# Patient Record
Sex: FEMALE | Race: WHITE | HISPANIC OR LATINO | URBAN - METROPOLITAN AREA
[De-identification: names, ages, dates, MRNs, and addresses within clinical notes are randomized per-mention and may not be internally consistent; named-entity substitution may affect disease eponyms.]

---

## 2021-11-10 ENCOUNTER — OFFICE VISIT (OUTPATIENT)
Dept: URBAN - METROPOLITAN AREA CLINIC 63 | Facility: CLINIC | Age: 56
End: 2021-11-10

## 2021-12-16 ENCOUNTER — OFFICE VISIT (OUTPATIENT)
Dept: URBAN - METROPOLITAN AREA CLINIC 63 | Facility: CLINIC | Age: 56
End: 2021-12-16

## 2021-12-24 LAB
% SATURATION: (no result)
ABSOLUTE BASOPHILS: (no result)
ABSOLUTE EOSINOPHILS: (no result)
ABSOLUTE LYMPHOCYTES: (no result)
ABSOLUTE MONOCYTES: (no result)
ABSOLUTE NEUTROPHILS: (no result)
ALBUMIN/GLOBULIN RATIO: (no result)
ALBUMIN: (no result)
ALKALINE PHOSPHATASE: (no result)
ALT: (no result)
AST: (no result)
BASOPHILS: (no result)
BILIRUBIN, TOTAL: (no result)
BUN/CREATININE RATIO: (no result)
CALCIUM: (no result)
CARBON DIOXIDE: (no result)
CHLORIDE: (no result)
CREATININE: (no result)
EGFR AFRICAN AMERIC: (no result)
EGFR NON-AFR. AMERI: (no result)
EOSINOPHILS: (no result)
FERRITIN: (no result)
FOLATE, SERUM: (no result)
GLOBULIN: (no result)
GLUCOSE: (no result)
HEMATOCRIT: (no result)
HEMOGLOBIN: (no result)
IRON BINDING CAPACITY: (no result)
IRON, TOTAL: (no result)
LYMPHOCYTES: (no result)
MCH: (no result)
MCHC: (no result)
MCV: (no result)
MONOCYTES: (no result)
MPV: (no result)
NEUTROPHILS: (no result)
PLATELET COUNT: (no result)
POTASSIUM: (no result)
PROTEIN, TOTAL: (no result)
RDW: (no result)
RED BLOOD CELL COUNT: (no result)
SODIUM: (no result)
UREA NITROGEN (BUN): (no result)
VITAMIN B12: (no result)
WHITE BLOOD CELL COUNT: (no result)

## 2022-01-05 ENCOUNTER — OFFICE VISIT (OUTPATIENT)
Dept: URBAN - METROPOLITAN AREA CLINIC 63 | Facility: CLINIC | Age: 57
End: 2022-01-05

## 2022-07-09 ENCOUNTER — TELEPHONE ENCOUNTER (OUTPATIENT)
Dept: URBAN - METROPOLITAN AREA CLINIC 121 | Facility: CLINIC | Age: 57
End: 2022-07-09

## 2022-07-09 RX ORDER — PANTOPRAZOLE SODIUM 40 MG/1
TABLET, DELAYED RELEASE ORAL ONCE A DAY
Refills: 0 | OUTPATIENT
Start: 2020-01-26 | End: 2021-11-10

## 2022-07-09 RX ORDER — ESCITALOPRAM 10 MG/1
TABLET, FILM COATED ORAL
Refills: 0 | OUTPATIENT
Start: 2020-02-13 | End: 2021-11-10

## 2022-07-09 RX ORDER — INSULIN GLARGINE 100 [IU]/ML
INJECTION, SOLUTION SUBCUTANEOUS
Refills: 0 | OUTPATIENT
Start: 2020-02-05 | End: 2021-11-10

## 2022-07-09 RX ORDER — INSULIN LISPRO 100 [IU]/ML
INJECTION, SOLUTION INTRAVENOUS; SUBCUTANEOUS
Refills: 0 | OUTPATIENT
Start: 2020-02-04 | End: 2021-11-10

## 2022-07-09 RX ORDER — FUROSEMIDE 40 MG/1
TABLET ORAL
Refills: 0 | OUTPATIENT
Start: 2020-02-03 | End: 2021-11-10

## 2022-07-09 RX ORDER — ALPRAZOLAM 1 MG/1
TABLET ORAL
Refills: 0 | OUTPATIENT
Start: 2020-02-13 | End: 2021-11-10

## 2022-07-09 RX ORDER — AMLODIPINE BESYLATE 2.5 MG/1
TABLET ORAL ONCE A DAY
Refills: 0 | OUTPATIENT
Start: 2020-01-26 | End: 2021-11-10

## 2022-07-09 RX ORDER — TELMISARTAN 40 MG/1
TABLET ORAL
Refills: 0 | OUTPATIENT
Start: 2020-02-11 | End: 2021-11-10

## 2022-07-09 RX ORDER — BRINZOLAMIDE/BRIMONIDINE TARTRATE 10; 2 MG/ML; MG/ML
SUSPENSION/ DROPS OPHTHALMIC
Refills: 0 | OUTPATIENT
Start: 2020-02-12 | End: 2021-11-10

## 2022-07-09 RX ORDER — SERTRALINE 25 MG/1
TABLET, FILM COATED ORAL ONCE A DAY
Refills: 0 | OUTPATIENT
Start: 2022-01-05 | End: 2022-01-05

## 2022-07-09 RX ORDER — HYDRALAZINE HYDROCHLORIDE 25 MG/1
TABLET ORAL ONCE A DAY
Refills: 0 | OUTPATIENT
Start: 2021-11-10 | End: 2022-01-05

## 2022-07-09 RX ORDER — CARVEDILOL 6.25 MG/1
TABLET, FILM COATED ORAL ONCE A DAY
Refills: 0 | OUTPATIENT
Start: 2020-02-11 | End: 2021-11-10

## 2022-07-09 RX ORDER — BRINZOLAMIDE/BRIMONIDINE TARTRATE 10; 2 MG/ML; MG/ML
SUSPENSION/ DROPS OPHTHALMIC TWICE A DAY
Refills: 0 | OUTPATIENT
Start: 2021-11-10 | End: 2022-01-05

## 2022-07-09 RX ORDER — ROSUVASTATIN CALCIUM 40 MG/1
TABLET, FILM COATED ORAL ONCE A DAY
Refills: 0 | OUTPATIENT
Start: 2021-11-10 | End: 2022-01-05

## 2022-07-09 RX ORDER — SERTRALINE 25 MG/1
TABLET, FILM COATED ORAL ONCE A DAY
Refills: 0 | OUTPATIENT
Start: 2021-11-10 | End: 2022-01-05

## 2022-07-10 ENCOUNTER — TELEPHONE ENCOUNTER (OUTPATIENT)
Dept: URBAN - METROPOLITAN AREA CLINIC 121 | Facility: CLINIC | Age: 57
End: 2022-07-10

## 2022-07-10 RX ORDER — ALPRAZOLAM 1 MG/1
TABLET ORAL AS NEEDED
Refills: 0 | Status: ACTIVE | COMMUNITY
Start: 2021-11-10

## 2022-07-10 RX ORDER — BRINZOLAMIDE/BRIMONIDINE TARTRATE 10; 2 MG/ML; MG/ML
SUSPENSION/ DROPS OPHTHALMIC TWICE A DAY
Refills: 0 | Status: ACTIVE | COMMUNITY
Start: 2022-01-05

## 2022-07-10 RX ORDER — INSULIN LISPRO 100 [IU]/ML
30 UNITS 3 TIMES A DAY INJECTION, SOLUTION INTRAVENOUS; SUBCUTANEOUS
Refills: 0 | Status: ACTIVE | COMMUNITY
Start: 2021-11-10

## 2022-07-10 RX ORDER — CARVEDILOL 6.25 MG/1
TABLET, FILM COATED ORAL TWICE A DAY
Refills: 0 | Status: ACTIVE | COMMUNITY
Start: 2021-11-10

## 2022-07-10 RX ORDER — ICOSAPENT ETHYL 1000 MG/1
CAPSULE ORAL ONCE A DAY
Refills: 0 | Status: ACTIVE | COMMUNITY
Start: 2022-01-05

## 2022-07-10 RX ORDER — HYDRALAZINE HYDROCHLORIDE 25 MG/1
TABLET ORAL ONCE A DAY
Refills: 0 | Status: ACTIVE | COMMUNITY
Start: 2022-01-05

## 2022-07-10 RX ORDER — CARVEDILOL 6.25 MG/1
TABLET, FILM COATED ORAL ONCE A DAY
Refills: 0 | Status: ACTIVE | COMMUNITY
Start: 2022-01-05

## 2022-07-10 RX ORDER — FUROSEMIDE 40 MG/1
TABLET ORAL ONCE A DAY
Refills: 0 | Status: ACTIVE | COMMUNITY
Start: 2021-11-10

## 2022-07-10 RX ORDER — ROSUVASTATIN CALCIUM 40 MG/1
TABLET, FILM COATED ORAL ONCE A DAY
Refills: 0 | Status: ACTIVE | COMMUNITY
Start: 2022-01-05

## 2022-07-10 RX ORDER — INSULIN GLARGINE 100 [IU]/ML
12 UNITS TWICE A DAY INJECTION, SOLUTION SUBCUTANEOUS
Refills: 0 | Status: ACTIVE | COMMUNITY
Start: 2021-11-10

## 2022-07-10 RX ORDER — PREDNISOLONE ACETATE 10 MG/ML
SUSPENSION/ DROPS OPHTHALMIC ONCE A DAY
Refills: 0 | Status: ACTIVE | COMMUNITY
Start: 2022-01-05

## 2023-05-11 ENCOUNTER — P2P PATIENT RECORD (OUTPATIENT)
Age: 58
End: 2023-05-11

## 2023-05-17 ENCOUNTER — TELEPHONE ENCOUNTER (OUTPATIENT)
Dept: URBAN - METROPOLITAN AREA CLINIC 64 | Facility: CLINIC | Age: 58
End: 2023-05-17

## 2023-06-05 ENCOUNTER — OFFICE VISIT (OUTPATIENT)
Dept: URBAN - METROPOLITAN AREA CLINIC 63 | Facility: CLINIC | Age: 58
End: 2023-06-05

## 2023-06-05 RX ORDER — CARVEDILOL 6.25 MG/1
TABLET, FILM COATED ORAL TWICE A DAY
Refills: 0 | COMMUNITY
Start: 2021-11-10

## 2023-06-05 RX ORDER — ROSUVASTATIN CALCIUM 40 MG/1
TABLET, FILM COATED ORAL ONCE A DAY
Refills: 0 | COMMUNITY
Start: 2022-01-05

## 2023-06-05 RX ORDER — INSULIN GLARGINE 100 [IU]/ML
12 UNITS TWICE A DAY INJECTION, SOLUTION SUBCUTANEOUS
Refills: 0 | COMMUNITY
Start: 2021-11-10

## 2023-06-05 RX ORDER — FUROSEMIDE 40 MG/1
TABLET ORAL ONCE A DAY
Refills: 0 | COMMUNITY
Start: 2021-11-10

## 2023-06-05 RX ORDER — ICOSAPENT ETHYL 1000 MG/1
CAPSULE ORAL ONCE A DAY
Refills: 0 | COMMUNITY
Start: 2022-01-05

## 2023-06-05 RX ORDER — PREDNISOLONE ACETATE 10 MG/ML
SUSPENSION/ DROPS OPHTHALMIC ONCE A DAY
Refills: 0 | COMMUNITY
Start: 2022-01-05

## 2023-06-05 RX ORDER — ALPRAZOLAM 1 MG/1
TABLET ORAL AS NEEDED
Refills: 0 | COMMUNITY
Start: 2021-11-10

## 2023-06-05 RX ORDER — HYDRALAZINE HYDROCHLORIDE 25 MG/1
TABLET ORAL ONCE A DAY
Refills: 0 | COMMUNITY
Start: 2022-01-05

## 2023-06-05 RX ORDER — BRINZOLAMIDE/BRIMONIDINE TARTRATE 10; 2 MG/ML; MG/ML
SUSPENSION/ DROPS OPHTHALMIC TWICE A DAY
Refills: 0 | COMMUNITY
Start: 2022-01-05

## 2023-06-05 RX ORDER — INSULIN LISPRO 100 [IU]/ML
30 UNITS 3 TIMES A DAY INJECTION, SOLUTION INTRAVENOUS; SUBCUTANEOUS
Refills: 0 | COMMUNITY
Start: 2021-11-10

## 2023-06-05 NOTE — HPI-TODAY'S VISIT:
Established patient: Abdominal pain, gas  PMH: CHF, IDDM 1B, HTN, HLD, CKD 4, fibromyalgia, polymyalgia, osteoarthritis, anxiety/depression, blindness, seizures, gastroparesis PSH: Cholecystectomy, appendectomy, hysterectomy,  x2, neck, elbow, hand

## 2023-06-05 NOTE — HPI-PREVIOUS LABS
Lab work dated 28 April 2023 demonstrates the following abnormalities: HDL 28, triglycerides 232, , cholesterol/HDL ratio 7.0, non-, glucose 192, BUN 30, creatinine 1.94, EGFR 29, WBC 13.5, hemoglobin 10.6, hematocrit 33.4%, MCH 26.5, MCHC 31.7, absolute neutrophils 9356. serum iron 42/13%, , hyaline cast 0-5, vitamin D 20.  All remaining lab values of CBC, CMP, protein electrophoresis, lipid panel and urinalysis are negative or within normal limits. ********** Lab work dated 23 December 2021 demonstrates the following abnormalities: WBC 14.3, MCHC 31.6, RDW 15.2%, absolute neutrophils 77054, glucose 315, BUN 36, creatinine 1.87, GFR non-African-American 29, iron saturation 15%, serum folate 4.3.  All remaining lab values of CBC, CMP and anemia panel are within normal limits.  See full results below. ********** Lab work dated 22 January 2020 demonstrates the following abnormalities: WBC 14.6, RBC 3.34, hemoglobin 9.4, hematocrit 29.1%, RDW 14.6%, absolute neutrophils 9.9, anion gap 6, glucose 188, BUN 30, creatinine 1.74, magnesium 2.6, GFR non-African-American 30.4.  All remaining lab values of CBC, CMP are within normal limits.

## 2023-06-05 NOTE — HPI-PREVIOUS IMAGING
CT virtual colonography/02 December 2021.  No large polyp, stricture or mass.  2.2 cm ascending colon lipoma.  Recommend repeat CT colonography in 5 years. ********** CT abdomen and pelvis without contrast/17 January 2020.  No evidence of acute pathology. ********** RUQ ultrasound/17 January 2020.  Mildly prominent liver. ********** Echocardiogram/16 January 2020 (Melody Bowman MD).  The quantitative EF by 3D imaging is 60%.  Normal left ventricular systolic function.  There is moderate concentric left ventricular hypertrophy.  There is grade 2 left ventricular diastolic dysfunction.  Left ventricular global longitudinal strain is reduced (-11.8%).  Right ventricular systolic function is normal.  Left atrium cavity is mildly dilated.  There is trace tricuspid valve regurgitation.  There is no pulmonary hypertension.  The estimated right ventricular systolic pressure is 24.0 mmHg.  There is a trivial to small circumferential pericardial effusion anterior and posterior to the heart.  No significant change compared to previous echocardiogram on 6/17/2019.  A complete echo was performed using color-flow Doppler, spectral Doppler, M-mode, strain and 3D.

## 2023-06-20 ENCOUNTER — WEB ENCOUNTER (OUTPATIENT)
Dept: URBAN - METROPOLITAN AREA CLINIC 63 | Facility: CLINIC | Age: 58
End: 2023-06-20

## 2023-06-20 ENCOUNTER — LAB OUTSIDE AN ENCOUNTER (OUTPATIENT)
Dept: URBAN - METROPOLITAN AREA CLINIC 63 | Facility: CLINIC | Age: 58
End: 2023-06-20

## 2023-06-20 ENCOUNTER — OFFICE VISIT (OUTPATIENT)
Dept: URBAN - METROPOLITAN AREA CLINIC 63 | Facility: CLINIC | Age: 58
End: 2023-06-20
Payer: COMMERCIAL

## 2023-06-20 VITALS
DIASTOLIC BLOOD PRESSURE: 58 MMHG | TEMPERATURE: 96.9 F | OXYGEN SATURATION: 98 % | WEIGHT: 213 LBS | HEIGHT: 61 IN | SYSTOLIC BLOOD PRESSURE: 102 MMHG | HEART RATE: 89 BPM | BODY MASS INDEX: 40.22 KG/M2

## 2023-06-20 DIAGNOSIS — R19.6 BREATH, FOUL: ICD-10-CM

## 2023-06-20 DIAGNOSIS — R10.13 EPIGASTRIC PAIN: ICD-10-CM

## 2023-06-20 PROBLEM — 79879001: Status: ACTIVE | Noted: 2023-06-20

## 2023-06-20 PROCEDURE — 99214 OFFICE O/P EST MOD 30 MIN: CPT | Performed by: NURSE PRACTITIONER

## 2023-06-20 RX ORDER — PREDNISOLONE ACETATE 10 MG/ML
SUSPENSION/ DROPS OPHTHALMIC ONCE A DAY
Refills: 0 | Status: DISCONTINUED | COMMUNITY
Start: 2022-01-05

## 2023-06-20 RX ORDER — INSULIN GLARGINE 100 [IU]/ML
12 UNITS TWICE A DAY INJECTION, SOLUTION SUBCUTANEOUS
Refills: 0 | Status: DISCONTINUED | COMMUNITY
Start: 2021-11-10

## 2023-06-20 RX ORDER — DULAGLUTIDE 4.5 MG/.5ML
AS DIRECTED INJECTION, SOLUTION SUBCUTANEOUS
Status: ACTIVE | COMMUNITY

## 2023-06-20 RX ORDER — INSULIN LISPRO 100 [IU]/ML
30 UNITS 3 TIMES A DAY INJECTION, SOLUTION INTRAVENOUS; SUBCUTANEOUS
Refills: 0 | Status: ACTIVE | COMMUNITY
Start: 2021-11-10

## 2023-06-20 RX ORDER — FAMOTIDINE 40 MG/1
1 TABLET AT BEDTIME TABLET, FILM COATED ORAL ONCE A DAY
Qty: 90 TABLET | Refills: 1 | OUTPATIENT
Start: 2023-06-20

## 2023-06-20 RX ORDER — ICOSAPENT ETHYL 1000 MG/1
CAPSULE ORAL ONCE A DAY
Refills: 0 | Status: DISCONTINUED | COMMUNITY
Start: 2022-01-05

## 2023-06-20 RX ORDER — ALPRAZOLAM 1 MG/1
TABLET ORAL AS NEEDED
Refills: 0 | Status: ACTIVE | COMMUNITY
Start: 2021-11-10

## 2023-06-20 RX ORDER — ROSUVASTATIN CALCIUM 40 MG/1
TABLET, FILM COATED ORAL ONCE A DAY
Refills: 0 | Status: ACTIVE | COMMUNITY
Start: 2022-01-05

## 2023-06-20 RX ORDER — FUROSEMIDE 40 MG/1
TABLET ORAL ONCE A DAY
Refills: 0 | Status: ACTIVE | COMMUNITY
Start: 2021-11-10

## 2023-06-20 RX ORDER — BRINZOLAMIDE/BRIMONIDINE TARTRATE 10; 2 MG/ML; MG/ML
SUSPENSION/ DROPS OPHTHALMIC TWICE A DAY
Refills: 0 | Status: DISCONTINUED | COMMUNITY
Start: 2022-01-05

## 2023-06-20 RX ORDER — PANTOPRAZOLE SODIUM 40 MG/1
1 TABLET TABLET, DELAYED RELEASE ORAL ONCE A DAY
Qty: 90 | Refills: 1 | OUTPATIENT
Start: 2023-06-20

## 2023-06-20 RX ORDER — HYDRALAZINE HYDROCHLORIDE 25 MG/1
TABLET ORAL ONCE A DAY
Refills: 0 | Status: DISCONTINUED | COMMUNITY
Start: 2022-01-05

## 2023-06-20 RX ORDER — CARVEDILOL 12.5 MG/1
1 TABLET WITH FOOD TABLET, FILM COATED ORAL TWICE A DAY
Refills: 0 | Status: ACTIVE | COMMUNITY
Start: 2021-11-10

## 2023-06-20 RX ORDER — SERTRALINE HYDROCHLORIDE 50 MG/1
1 TABLET TABLET, FILM COATED ORAL ONCE A DAY
Status: ACTIVE | COMMUNITY

## 2023-06-20 NOTE — HPI-PREVIOUS LABS
Lab work dated 28 April 2023 demonstrates the following abnormalities: HDL 28, triglycerides 232, , cholesterol/HDL ratio 7.0, non-, glucose 192, BUN 30, creatinine 1.94, EGFR 29, WBC 13.5, hemoglobin 10.6, hematocrit 33.4%, MCH 26.5, MCHC 31.7, absolute neutrophils 9356. serum iron 42/13%, , hyaline cast 0-5, vitamin D 20.  All remaining lab values of CBC, CMP, protein electrophoresis, lipid panel and urinalysis are negative or within normal limits. ********** Lab work dated 23 December 2021 demonstrates the following abnormalities: WBC 14.3, MCHC 31.6, RDW 15.2%, absolute neutrophils 04121, glucose 315, BUN 36, creatinine 1.87, GFR non-African-American 29, iron saturation 15%, serum folate 4.3.  All remaining lab values of CBC, CMP and anemia panel are within normal limits.  See full results below. ********** Lab work dated 22 January 2020 demonstrates the following abnormalities: WBC 14.6, RBC 3.34, hemoglobin 9.4, hematocrit 29.1%, RDW 14.6%, absolute neutrophils 9.9, anion gap 6, glucose 188, BUN 30, creatinine 1.74, magnesium 2.6, GFR non-African-American 30.4.  All remaining lab values of CBC, CMP are within normal limits. .

## 2023-06-20 NOTE — HPI-TODAY'S VISIT:
Thank you very much for kindly referring Ladonna Kay, a very pleasant 57-year-old female, back to our service for abdominal pain and gas.  Past medical history significant for CHF, hypertension, hyperlipidemia, IDDM 1, CKD 4, fibromyalgia, polymyalgia, osteoarthritis, anxiety, depression, blindness, seizures, gastroparesis and ascending colon lipoma.  Past surgical history is significant for cholecystectomy, appendectomy,  x2, hysterectomy, cardiac catheterization and numerous orthopedic procedures.  Her last colorectal cancer screening was by CT virtual colonography on 2021 and was unremarkable except for a 2.2 cm ascending colon lipoma with no large polyps, strictures or mass.  She was given a 5-year repeat CT colonography recall.  Ladonna presents today complaining of "excruciating" postprandial pain that has been ongoing for the past 7-8 months.  She relates use of Tylenol, 500 mg, 2-3 capsules/day for her discomfort but also states that it, "does not do anything".  She will have slight dysphagia to meat, but states it is no worse than it is always been over the past 5 years.  Finally, she complains of a "foul smell coming out of me with a deep breath and when I burp I smell rotten eggs."  She is otherwise without gastrointestinal complaint, but then states she has about 1 bowel movement per day or every other day with, "a lot of diarrhea" ([place] 5-6).  She denies pyrosis, change in bowel habits, rectal bleeding, melena or unintentional weight loss.  Of note, she was previously on pantoprazole but has not taken it for over 3 years as best she can recall.

## 2023-07-20 NOTE — PHYSICAL EXAM GASTROINTESTINAL
: Abdomen; soft, nontender, nondistended , no guarding or rigidity, no masses palpable , normal bowel sounds.  Liver and Spleen; no hepatomegaly present,  liver nontender , spleen not palpable 90

## 2023-08-25 ENCOUNTER — OFFICE VISIT (OUTPATIENT)
Dept: URBAN - METROPOLITAN AREA CLINIC 63 | Facility: CLINIC | Age: 58
End: 2023-08-25

## 2023-09-05 ENCOUNTER — TELEPHONE ENCOUNTER (OUTPATIENT)
Dept: URBAN - METROPOLITAN AREA CLINIC 63 | Facility: CLINIC | Age: 58
End: 2023-09-05

## 2023-09-07 ENCOUNTER — OFFICE VISIT (OUTPATIENT)
Dept: URBAN - METROPOLITAN AREA TELEHEALTH 1 | Facility: TELEHEALTH | Age: 58
End: 2023-09-07
Payer: COMMERCIAL

## 2023-09-07 VITALS
BODY MASS INDEX: 39.08 KG/M2 | DIASTOLIC BLOOD PRESSURE: 83 MMHG | HEART RATE: 66 BPM | TEMPERATURE: 98 F | WEIGHT: 207 LBS | SYSTOLIC BLOOD PRESSURE: 127 MMHG | HEIGHT: 61 IN

## 2023-09-07 DIAGNOSIS — R10.84 GENERALIZED ABDOMINAL PAIN: ICD-10-CM

## 2023-09-07 PROCEDURE — 99213 OFFICE O/P EST LOW 20 MIN: CPT | Performed by: NURSE PRACTITIONER

## 2023-09-07 RX ORDER — CARVEDILOL 12.5 MG/1
1 TABLET WITH FOOD TABLET, FILM COATED ORAL TWICE A DAY
Refills: 0 | Status: ACTIVE | COMMUNITY
Start: 2021-11-10

## 2023-09-07 RX ORDER — FUROSEMIDE 40 MG/1
TABLET ORAL ONCE A DAY
Refills: 0 | Status: ACTIVE | COMMUNITY
Start: 2021-11-10

## 2023-09-07 RX ORDER — SERTRALINE HYDROCHLORIDE 50 MG/1
1 TABLET TABLET, FILM COATED ORAL ONCE A DAY
Status: ACTIVE | COMMUNITY

## 2023-09-07 RX ORDER — INSULIN LISPRO 100 [IU]/ML
30 UNITS 3 TIMES A DAY INJECTION, SOLUTION INTRAVENOUS; SUBCUTANEOUS
Refills: 0 | Status: ACTIVE | COMMUNITY
Start: 2021-11-10

## 2023-09-07 RX ORDER — ALPRAZOLAM 1 MG/1
TABLET ORAL AS NEEDED
Refills: 0 | Status: ACTIVE | COMMUNITY
Start: 2021-11-10

## 2023-09-07 RX ORDER — DICYCLOMINE HYDROCHLORIDE 20 MG/1
1 TABLET TABLET ORAL
Qty: 270 | Refills: 1 | OUTPATIENT
Start: 2023-09-07 | End: 2024-03-04

## 2023-09-07 RX ORDER — PANTOPRAZOLE SODIUM 40 MG/1
1 TABLET TABLET, DELAYED RELEASE ORAL ONCE A DAY
Qty: 90 | Refills: 1 | Status: ACTIVE | COMMUNITY
Start: 2023-06-20

## 2023-09-07 RX ORDER — DULAGLUTIDE 4.5 MG/.5ML
AS DIRECTED INJECTION, SOLUTION SUBCUTANEOUS
Status: ACTIVE | COMMUNITY

## 2023-09-07 RX ORDER — FAMOTIDINE 40 MG/1
1 TABLET AT BEDTIME TABLET, FILM COATED ORAL ONCE A DAY
Qty: 90 TABLET | Refills: 1 | Status: ACTIVE | COMMUNITY
Start: 2023-06-20

## 2023-09-07 RX ORDER — ROSUVASTATIN CALCIUM 40 MG/1
TABLET, FILM COATED ORAL ONCE A DAY
Refills: 0 | Status: ACTIVE | COMMUNITY
Start: 2022-01-05

## 2023-09-07 NOTE — HPI-PREVIOUS LABS
Lab work dated 28 April 2023 demonstrates the following abnormalities: HDL 28, triglycerides 232, , cholesterol/HDL ratio 7.0, non-, glucose 192, BUN 30, creatinine 1.94, EGFR 29, WBC 13.5, hemoglobin 10.6, hematocrit 33.4%, MCH 26.5, MCHC 31.7, absolute neutrophils 9356. serum iron 42/13%, , hyaline cast 0-5, vitamin D 20.  All remaining lab values of CBC, CMP, protein electrophoresis, lipid panel and urinalysis are negative or within normal limits. ********** Lab work dated 23 December 2021 demonstrates the following abnormalities: WBC 14.3, MCHC 31.6, RDW 15.2%, absolute neutrophils 49369, glucose 315, BUN 36, creatinine 1.87, GFR non-African-American 29, iron saturation 15%, serum folate 4.3.  All remaining lab values of CBC, CMP and anemia panel are within normal limits.  See full results below. ********** Lab work dated 22 January 2020 demonstrates the following abnormalities: WBC 14.6, RBC 3.34, hemoglobin 9.4, hematocrit 29.1%, RDW 14.6%, absolute neutrophils 9.9, anion gap 6, glucose 188, BUN 30, creatinine 1.74, magnesium 2.6, GFR non-African-American 30.4.  All remaining lab values of CBC, CMP are within normal limits. .

## 2023-09-07 NOTE — HPI-TODAY'S VISIT:
Ladonna Kay is a very pleasant 58-year-old female seen in follow-up of abdominal pain and gas.  At last office visit, she complained of "excruciating" postprandial pain that had persisted for the past 7 months.  She was given pantoprazole in the morning and famotidine at night, which she states, "has not helped at all.  When asked to describe her symptoms again, she complains of abdominal pain, cramping and discomfort when she has a bowel movement or passes gas and relates the discomfort is alleviated with these actions.  She is otherwise asymptomatic from a GI perspective and denies pyrosis, dysphagia, dyspepsia, blood per rectum, melena or unintentional weight loss.

## 2023-09-07 NOTE — HPI-PREVIOUS IMAGING
Upper GI series/28 August 2023.  Normal double contrast barium UGI. ********** CT virtual colonography/02 December 2021.  No large polyp, stricture or mass.  2.2 cm ascending colon lipoma.  Recommend repeat CT colonography in 5 years. ********** CT abdomen and pelvis without contrast/17 January 2020.  No evidence of acute pathology. ********** RUQ ultrasound/17 January 2020.  Mildly prominent liver. ********** Echocardiogram/16 January 2020 (Melody Bowman MD).  The quantitative EF by 3D imaging is 60%.  Normal left ventricular systolic function.  There is moderate concentric left ventricular hypertrophy.  There is grade 2 left ventricular diastolic dysfunction.  Left ventricular global longitudinal strain is reduced (-11.8%).  Right ventricular systolic function is normal.  Left atrium cavity is mildly dilated.  There is trace tricuspid valve regurgitation.  There is no pulmonary hypertension.  The estimated right ventricular systolic pressure is 24.0 mmHg.  There is a trivial to small circumferential pericardial effusion anterior and posterior to the heart.  No significant change compared to previous echocardiogram on 6/17/2019.  A complete echo was performed using color-flow Doppler, spectral Doppler, M-mode, strain and 3D.

## 2023-09-12 ENCOUNTER — TELEPHONE ENCOUNTER (OUTPATIENT)
Dept: URBAN - METROPOLITAN AREA CLINIC 64 | Facility: CLINIC | Age: 58
End: 2023-09-12

## 2023-10-06 ENCOUNTER — DASHBOARD ENCOUNTERS (OUTPATIENT)
Age: 58
End: 2023-10-06

## 2023-10-09 ENCOUNTER — OFFICE VISIT (OUTPATIENT)
Dept: URBAN - METROPOLITAN AREA CLINIC 63 | Facility: CLINIC | Age: 58
End: 2023-10-09

## 2023-10-09 RX ORDER — DULAGLUTIDE 4.5 MG/.5ML
AS DIRECTED INJECTION, SOLUTION SUBCUTANEOUS
Status: ACTIVE | COMMUNITY

## 2023-10-09 RX ORDER — ROSUVASTATIN CALCIUM 40 MG/1
TABLET, FILM COATED ORAL ONCE A DAY
Refills: 0 | Status: ACTIVE | COMMUNITY
Start: 2022-01-05

## 2023-10-09 RX ORDER — INSULIN LISPRO 100 [IU]/ML
30 UNITS 3 TIMES A DAY INJECTION, SOLUTION INTRAVENOUS; SUBCUTANEOUS
Refills: 0 | Status: ACTIVE | COMMUNITY
Start: 2021-11-10

## 2023-10-09 RX ORDER — PANTOPRAZOLE SODIUM 40 MG/1
1 TABLET TABLET, DELAYED RELEASE ORAL ONCE A DAY
Qty: 90 | Refills: 1 | Status: ACTIVE | COMMUNITY
Start: 2023-06-20

## 2023-10-09 RX ORDER — ALPRAZOLAM 1 MG/1
TABLET ORAL AS NEEDED
Refills: 0 | Status: ACTIVE | COMMUNITY
Start: 2021-11-10

## 2023-10-09 RX ORDER — FAMOTIDINE 40 MG/1
1 TABLET AT BEDTIME TABLET, FILM COATED ORAL ONCE A DAY
Qty: 90 TABLET | Refills: 1 | Status: ACTIVE | COMMUNITY
Start: 2023-06-20

## 2023-10-09 RX ORDER — FUROSEMIDE 40 MG/1
TABLET ORAL ONCE A DAY
Refills: 0 | Status: ACTIVE | COMMUNITY
Start: 2021-11-10

## 2023-10-09 RX ORDER — CARVEDILOL 12.5 MG/1
1 TABLET WITH FOOD TABLET, FILM COATED ORAL TWICE A DAY
Refills: 0 | Status: ACTIVE | COMMUNITY
Start: 2021-11-10

## 2023-10-09 RX ORDER — DICYCLOMINE HYDROCHLORIDE 20 MG/1
1 TABLET TABLET ORAL
Qty: 270 | Refills: 1 | Status: ACTIVE | COMMUNITY
Start: 2023-09-07 | End: 2024-03-04

## 2023-10-09 RX ORDER — SERTRALINE HYDROCHLORIDE 50 MG/1
1 TABLET TABLET, FILM COATED ORAL ONCE A DAY
Status: ACTIVE | COMMUNITY

## 2023-10-09 NOTE — HPI-PREVIOUS LABS
Lab work dated 28 April 2023 demonstrates the following abnormalities: HDL 28, triglycerides 232, , cholesterol/HDL ratio 7.0, non-, glucose 192, BUN 30, creatinine 1.94, EGFR 29, WBC 13.5, hemoglobin 10.6, hematocrit 33.4%, MCH 26.5, MCHC 31.7, absolute neutrophils 9356. serum iron 42/13%, , hyaline cast 0-5, vitamin D 20.  All remaining lab values of CBC, CMP, protein electrophoresis, lipid panel and urinalysis are negative or within normal limits. ********** Lab work dated 23 December 2021 demonstrates the following abnormalities: WBC 14.3, MCHC 31.6, RDW 15.2%, absolute neutrophils 66355, glucose 315, BUN 36, creatinine 1.87, GFR non-African-American 29, iron saturation 15%, serum folate 4.3.  All remaining lab values of CBC, CMP and anemia panel are within normal limits.  See full results below. ********** Lab work dated 22 January 2020 demonstrates the following abnormalities: WBC 14.6, RBC 3.34, hemoglobin 9.4, hematocrit 29.1%, RDW 14.6%, absolute neutrophils 9.9, anion gap 6, glucose 188, BUN 30, creatinine 1.74, magnesium 2.6, GFR non-African-American 30.4.  All remaining lab values of CBC, CMP are within normal limits.

## 2023-10-09 NOTE — HPI-TODAY'S VISIT:
dicyclomine effective?  OLD: Ladonna Kay is a very pleasant 58-year-old female seen in follow-up of abdominal pain and gas.  At last office visit, she complained of "excruciating" postprandial pain that had persisted for the past 7 months.  She was given pantoprazole in the morning and famotidine at night, which she states, "has not helped at all.  When asked to describe her symptoms again, she complains of abdominal pain, cramping and discomfort when she has a bowel movement or passes gas and relates the discomfort is alleviated with these actions.  She is otherwise asymptomatic from a GI perspective and denies pyrosis, dysphagia, dyspepsia, blood per rectum, melena or unintentional weight loss.   PLAN: Ladonna's physical exam is significant for her use of a mobility chair, right eye blindness, as before, and obesity (BMI 40.24), but otherwise unremarkable. She is in no acute distress and her abdominal exam is benign. Differential diagnosis includes PUD versus gastroduodenitis versus GERD versus Zenker's diverticulum versus gastroparesis. I have ordered an upper GI series to rule out Zenker's and PUD and will start her on acid suppression. Due to her CKD, I will start her on pantoprazole, 40 mg, every morning and famotidine, 40 mg nightly. Follow-up will be in 2 months and if still symptomatic, EGD would be indicated, but in a hospital setting due to her comorbidities. Thank you again for kindly referring this very pleasant woman back to our service.

## 2024-03-18 ENCOUNTER — NEW PATIENT (OUTPATIENT)
Dept: URBAN - METROPOLITAN AREA CLINIC 29 | Facility: CLINIC | Age: 59
End: 2024-03-18

## 2024-03-18 DIAGNOSIS — H18.232: ICD-10-CM

## 2024-03-18 PROCEDURE — 99204 OFFICE O/P NEW MOD 45 MIN: CPT

## 2024-03-18 ASSESSMENT — VISUAL ACUITY
OU_CC: 20/400
OS_SC: 20/400

## 2024-03-18 ASSESSMENT — KERATOMETRY
OS_AXISANGLE2_DEGREES: 160
OS_K1POWER_DIOPTERS: 44.00
OS_K2POWER_DIOPTERS: 45.50
OS_AXISANGLE_DEGREES: 070

## 2024-03-18 ASSESSMENT — TONOMETRY: OS_IOP_MMHG: 15

## 2025-05-20 ENCOUNTER — P2P PATIENT RECORD (OUTPATIENT)
Age: 60
End: 2025-05-20

## 2025-05-22 ENCOUNTER — TELEPHONE ENCOUNTER (OUTPATIENT)
Dept: URBAN - METROPOLITAN AREA CLINIC 60 | Facility: CLINIC | Age: 60
End: 2025-05-22

## 2025-05-29 ENCOUNTER — P2P PATIENT RECORD (OUTPATIENT)
Age: 60
End: 2025-05-29

## 2025-06-03 ENCOUNTER — OFFICE VISIT (OUTPATIENT)
Dept: URBAN - METROPOLITAN AREA CLINIC 63 | Facility: CLINIC | Age: 60
End: 2025-06-03
Payer: COMMERCIAL

## 2025-06-03 DIAGNOSIS — R10.9 ABDOMINAL CRAMPING: ICD-10-CM

## 2025-06-03 PROCEDURE — 99213 OFFICE O/P EST LOW 20 MIN: CPT

## 2025-06-03 RX ORDER — INSULIN LISPRO 200 [IU]/ML
INJECTION, SOLUTION SUBCUTANEOUS
Qty: 15 MILLILITER | Status: ACTIVE | COMMUNITY

## 2025-06-03 RX ORDER — OMEPRAZOLE 20 MG/1
CAPSULE, DELAYED RELEASE ORAL
Qty: 30 CAPSULE | Status: ACTIVE | COMMUNITY

## 2025-06-03 RX ORDER — TORSEMIDE 20 MG/1
TABLET ORAL
Qty: 90 TABLET | Status: ACTIVE | COMMUNITY

## 2025-06-03 RX ORDER — ATORVASTATIN CALCIUM 80 MG/1
1 TABLET TABLET, FILM COATED ORAL ONCE A DAY
Qty: 30 | Status: ACTIVE | COMMUNITY
Start: 2025-06-03

## 2025-06-03 RX ORDER — SEMAGLUTIDE 0.68 MG/ML
INJECTION, SOLUTION SUBCUTANEOUS
Qty: 3 MILLILITER | Status: ACTIVE | COMMUNITY

## 2025-06-03 RX ORDER — CARVEDILOL 6.25 MG/1
TABLET, FILM COATED ORAL
Qty: 180 TABLET | Status: ACTIVE | COMMUNITY

## 2025-06-03 RX ORDER — INSULIN GLARGINE 100 [IU]/ML
INJECTION, SOLUTION SUBCUTANEOUS
Qty: 45 MILLILITER | Status: ACTIVE | COMMUNITY

## 2025-06-03 RX ORDER — EMPAGLIFLOZIN 10 MG/1
TABLET, FILM COATED ORAL
Qty: 90 TABLET | Status: ACTIVE | COMMUNITY

## 2025-06-03 NOTE — HPI-TODAY'S VISIT:
This is a very pleasant 59-year-old female who presents to the office with a chief complaint of "GERD".  Past medical history is significant for GERD, vitamin D deficiency, hypertension, type 1 diabetes with neuropathy hyperlipidemia, CHF, OA, history of seizures, fibromyalgia, chronic kidney disease stage IV.  Past surgical history is significant for cholecystectomy, appendectomy,  x 2, hysterectomy, cardiac catheterization, tonsillectomy, "neck surgery", "elbow surgery", "hand surgery".  Family history is significant for sororal breast cancer. Last colonoscopy Last EGD Last CT colonoscopy, 2021, 5-year recall Cardiologist:? . Patient was last seen in the office on 2023 for an abdominal pain and gas follow-up.  At last visit, patient was endorsing "excruciating" post-prandial pain persisting for the last 7 months at the time she was taking pantoprazole and famotidine daily, which did not improve her symptoms.  Patient was experiencing a crampy abdominal pain which would alleviate the discomfort when she has a BM or passes gas.  Patient was given dicyclomine and was told to follow-up in 1 month, as well as a trial on pantoprazole 40mg BID in place of famotidine. It was noted that if patient did not improve with these measures, she would require procedures, but in a hospital setting due to her significant comorbidites. . Patient presents today explaining that she is here for GERD as well as abdominal pain.  She endorses similar symptoms to her last visit in , in which she would have  severe, sharp abdominal pain revolving around gas movement or BMs, which would occur mostly postprandially.  She explains today that she has an appointment with a oncologist surgeon who she believes  might be performing an abdominal lysis of adhesions.  I explained to the patient today that due to this problem continuing this could indicate need for colonoscopy and EGD, and if these are necessary she would need to have this in the hospital setting due to her significant comorbidity as well as need for assistance ambulating due to right eye blindness.  I explained to her that we will need to refer her to [facility] today for all further GI care, patient is agreeable. . Patient denies belching, bloating, constipation, diarrhea, dysphagia, pyrosis, melena, hematochezia, hematemesis, nausea, vomiting, BRBPR, and unintentional weight loss.

## 2025-06-03 NOTE — PHYSICAL EXAM CONSTITUTIONAL:
well developed, well nourished , in no acute distress , normal communication ability PATIENT USING AUTOMATED WHEELCHAIR

## 2025-06-03 NOTE — HPI-PREVIOUS LABS
Labs dated 5/15/2025 - Lipid panel - HDL cholesterol 28 - Triglycerides 186 - Rest within normal limits . CMP - Glucose 248 - Creatinine 1.92 - GFR 29 - Rest within normal limits Hemoglobin A1c 8.5 . CBC - White blood cell count 17.6 - Platelet count 403 - Absolute neutrophils 14,150 - Rest within normal limits . TSH - Within normal limits . Lab work dated 28 April 2023 demonstrates the following abnormalities: HDL 28, triglycerides 232, , cholesterol/HDL ratio 7.0, non-, glucose 192, BUN 30, creatinine 1.94, EGFR 29, WBC 13.5, hemoglobin 10.6, hematocrit 33.4%, MCH 26.5, MCHC 31.7, absolute neutrophils 9356. serum iron 42/13%, , hyaline cast 0-5, vitamin D 20.  All remaining lab values of CBC, CMP, protein electrophoresis, lipid panel and urinalysis are negative or within normal limits. ********** Lab work dated 23 December 2021 demonstrates the following abnormalities: WBC 14.3, MCHC 31.6, RDW 15.2%, absolute neutrophils 87990, glucose 315, BUN 36, creatinine 1.87, GFR non-African-American 29, iron saturation 15%, serum folate 4.3.  All remaining lab values of CBC, CMP and anemia panel are within normal limits.  See full results below. ********** Lab work dated 22 January 2020 demonstrates the following abnormalities: WBC 14.6, RBC 3.34, hemoglobin 9.4, hematocrit 29.1%, RDW 14.6%, absolute neutrophils 9.9, anion gap 6, glucose 188, BUN 30, creatinine 1.74, magnesium 2.6, GFR non-African-American 30.4.  All remaining lab values of CBC, CMP are within normal limits.